# Patient Record
Sex: FEMALE | Race: WHITE | NOT HISPANIC OR LATINO | Employment: OTHER | URBAN - METROPOLITAN AREA
[De-identification: names, ages, dates, MRNs, and addresses within clinical notes are randomized per-mention and may not be internally consistent; named-entity substitution may affect disease eponyms.]

---

## 2018-03-05 ENCOUNTER — TRANSCRIBE ORDERS (OUTPATIENT)
Dept: ADMINISTRATIVE | Facility: HOSPITAL | Age: 73
End: 2018-03-05

## 2018-03-05 ENCOUNTER — TRANSCRIBE ORDERS (OUTPATIENT)
Dept: NEUROSURGERY | Facility: CLINIC | Age: 73
End: 2018-03-05

## 2018-03-05 DIAGNOSIS — Z01.818 PRE-PROCEDURAL EXAMINATION: Primary | ICD-10-CM

## 2018-03-05 DIAGNOSIS — D32.9 MENINGIOMA (HCC): Primary | ICD-10-CM

## 2018-04-10 ENCOUNTER — APPOINTMENT (OUTPATIENT)
Dept: LAB | Facility: CLINIC | Age: 73
End: 2018-04-10
Payer: COMMERCIAL

## 2018-04-10 DIAGNOSIS — Z01.818 PRE-PROCEDURAL EXAMINATION: ICD-10-CM

## 2018-04-10 LAB
BUN SERPL-MCNC: 10 MG/DL (ref 5–25)
CREAT SERPL-MCNC: 0.96 MG/DL (ref 0.6–1.3)
GFR SERPL CREATININE-BSD FRML MDRD: 60 ML/MIN/1.73SQ M

## 2018-04-10 PROCEDURE — 36415 COLL VENOUS BLD VENIPUNCTURE: CPT

## 2018-04-10 PROCEDURE — 82565 ASSAY OF CREATININE: CPT

## 2018-04-10 PROCEDURE — 84520 ASSAY OF UREA NITROGEN: CPT

## 2018-04-19 ENCOUNTER — HOSPITAL ENCOUNTER (OUTPATIENT)
Dept: MRI IMAGING | Facility: CLINIC | Age: 73
Discharge: HOME | End: 2018-04-19
Payer: COMMERCIAL

## 2018-04-19 DIAGNOSIS — D32.9 MENINGIOMA (HCC): ICD-10-CM

## 2018-04-19 PROCEDURE — A9585 GADOBUTROL INJECTION: HCPCS | Performed by: PHYSICIAN ASSISTANT

## 2018-04-19 PROCEDURE — 70553 MRI BRAIN STEM W/O & W/DYE: CPT

## 2018-04-19 RX ADMIN — GADOBUTROL 7 ML: 604.72 INJECTION INTRAVENOUS at 13:25

## 2018-04-24 ENCOUNTER — TELEPHONE (OUTPATIENT)
Dept: NEUROSURGERY | Facility: CLINIC | Age: 73
End: 2018-04-24

## 2018-04-24 DIAGNOSIS — H74.90 DISORDER OF MASTOID, UNSPECIFIED LATERALITY: Primary | ICD-10-CM

## 2018-04-24 NOTE — TELEPHONE ENCOUNTER
Patient had her follow up MRI Brain completed (4/19/18)  but does not have pending office visit  Please have office contact patient and arrange office visit (cheikh Bangura / Dr Giuseppe Yu) to review MRI Brain with patient  Also - please inform patient and PCP (Dr Merrill Guardian) that patient needs to follow up with PCP for evaluation / management of incidental finding reported on MRI Brain ("PARANASAL SINUSES:  Inferior mastoid opacification")  Referral to PCP placed in Epic      Thank you

## 2018-04-26 NOTE — TELEPHONE ENCOUNTER
Called listed number without response  Message left requesting a call back to schedule an appt  To review her recent MRI

## 2018-05-03 ENCOUNTER — TELEPHONE (OUTPATIENT)
Dept: NEUROSURGERY | Facility: CLINIC | Age: 73
End: 2018-05-03

## 2018-05-03 NOTE — TELEPHONE ENCOUNTER
Pt returned call to appt line which was tranferred to [facility] and now being addressed by her

## 2018-05-03 NOTE — TELEPHONE ENCOUNTER
Patient called stating that she is a previous patient of Dr Mariia Pepe and she recently had an MRI brain with and without contrast done on 4/19/18 for follow up of meningioma s/p resection  She reported that last time she was seen with Dr Mariia Pepe he stated that a skype conversation could be done for her next appointment since she lives 2 hours away  I told her I would review with Dr Mariia Pepe and give her a call back with an answer on what we should do

## 2018-06-07 ENCOUNTER — OFFICE VISIT (OUTPATIENT)
Dept: NEUROSURGERY | Facility: CLINIC | Age: 73
End: 2018-06-07

## 2018-06-07 DIAGNOSIS — Z48.811 ENCOUNTER FOR SURGICAL AFTERCARE FOLLOWING SURGERY OF NERVOUS SYSTEM: Primary | ICD-10-CM

## 2018-06-07 RX ORDER — AMOXICILLIN AND CLAVULANATE POTASSIUM 875; 125 MG/1; MG/1
TABLET, FILM COATED ORAL
COMMUNITY
Start: 2018-03-14

## 2018-06-07 RX ORDER — ASPIRIN 81 MG/1
1 TABLET ORAL DAILY
COMMUNITY

## 2018-06-07 RX ORDER — TAMOXIFEN CITRATE 10 MG/1
1 TABLET ORAL 2 TIMES DAILY
COMMUNITY
Start: 2018-05-16

## 2018-06-07 NOTE — LETTER
June 7, 2018     Faviola Amaya DO  [address]  [place] [address] [facility]  [address]    Patient: Tra Cobian   YOB: 1945   Date of Visit: 6/7/2018       Dear Dr Zion Bowie: Thank you for referring Cole Up to me for evaluation  Below are my notes for this consultation  If you have questions, please do not hesitate to call me  I look forward to following your patient along with you  Sincerely,        Shweta Fox MD        CC: Deedee Cordova MD  6/7/2018  2:12 PM  Sign at close encounter  Neurosurgery Office Note  Tra Cobian is a 68 y o  female    Type of Visit: Follow-up (Coference call)      CONCEPCION / Jazmine Moyer was seen today for follow-up  Diagnoses and all orders for this visit:    Encounter for surgical aftercare following surgery of nervous system  -     MRI brain with and without contrast; Future          DISCUSSION SUMMARY  This is a 78-year-old female who is status post resection of a large intraparenchymal and periventricular meningioma  This demonstrates complete resection of the mass  There is no sign of recurrence  The patient is nonfocal neurologically and she is asymptomatic  She is very happy with her result  Because the position and size of the original tumor and its proximity to the intraventricular system continued follow-up is indicated  Because of the appearance of this current MRI I think would be safe to wait 5 years before repeating a study  This was a telephone conversation with the patient  She could not be seen directly because of the significant distance and a hardship been traveling along distance  CHIEF COMPLAINT  Conference call for 2 year follow up meningioma with MRI brain s/p Image guided right transcortical approach for resection of large occipital parietal intraventricular mass 1/6/12  Diagnoses:   1  Meningioma (225 2) (D32 9)  2  Surgical aftercare, neoplasm (V58 42) (Z48 3)  3  Neoplasm, brain (239 6) (D49 6)    SX INFO  - 1/6/12 (DKO/HOD) Image guided right transcortical approach for resection of large occipital parietal intraventricular mass  HISTORY OF PRESENT ILLNESS  Patient denies headaches  She has no seizures  She has no nausea or vomiting  Her concentration is normal   She denies any difficulty with reading  She has no problems with her sleep-wake cycles  REVIEW OF SYSTEMS  Review of Systems   Constitutional: Negative  HENT: Negative  Eyes: Negative  Respiratory: Negative  Cardiovascular: Negative  Gastrointestinal: Negative  Endocrine: Negative  Genitourinary: Negative  Musculoskeletal: Negative  Skin: Negative  Allergic/Immunologic: Negative  Neurological: Negative  Hematological: Negative  Psychiatric/Behavioral: Negative  All other systems reviewed and are negative  The patient's ROS was reviewed by MD RAY reviewed the ROS    Active Ambulatory Problems     Diagnosis Date Noted    Encounter for surgical aftercare following surgery of nervous system 06/07/2018     Resolved Ambulatory Problems     Diagnosis Date Noted    No Resolved Ambulatory Problems     Past Medical History:   Diagnosis Date    [facility] [address] [facility]     [facility]oplasm[facility] brain [facility] [address] [facility]     Surgical aftercare, neoplasm        Past Surgical History:   Procedure Laterality Date    BRAIN TUMOR EXCISION      1/6/12 (DKO/HOD) Image guided right transcortical approach for resection of large occipital parietal intraventricular mass   BREAST LUMPECTOMY Right     Cancer cells       History   Smoking Status    Former Smoker    Quit date: 2008   Smokeless Tobacco    Former User       History   Alcohol Use    Yes     Comment: occasionally       History   Drug Use No       There were no vitals filed for this visit  THIS WAS A CONFERENCE CALL SO VITALS COULD BE TAKEN AT THE TIME   MARLENE    Current Outpatient Prescriptions:     amoxicillin-clavulanate (AUGMENTIN) 875-125 mg per tablet, As needed, Disp: , Rfl:     aspirin (ECOTRIN LOW STRENGTH) 81 mg EC tablet, Take 1 tablet by mouth daily, Disp: , Rfl:     tamoxifen (NOLVADEX) 10 mg tablet, Take 1 tablet by mouth 2 (two) times a day, Disp: , Rfl:     The following portions of the patient's history were reviewed by MD and updated by MA as appropriate: allergies, current medications, past family history, past medical history, past social history, past surgical history and problem list       Physical Exam  This was performed by questioning but not by direct examination as the patient was not physically present our office  Her strength is 5/5 bilaterally  There is no noticeable asymmetry in her face when viewed in a mere  She has no difficulty with her gait  She denies any balance difficulties    RESULTS/DATA  An MRI of the brain is compared with a study from 2011  This demonstrates a complete resection of the parietal mass  There is no sign of recurrence  The midline has returned to a normal position

## 2018-06-07 NOTE — PROGRESS NOTES
Neurosurgery Office Note  Ofelia Wilson is a 68 y o  female    Type of Visit: Follow-up (Coference call)      ASSESSMENT / Harmeetdeisi Lee was seen today for follow-up  Diagnoses and all orders for this visit:    Encounter for surgical aftercare following surgery of nervous system  -     MRI brain with and without contrast; Future          DISCUSSION SUMMARY  This is a 72-year-old female who is status post resection of a large intraparenchymal and periventricular meningioma  This demonstrates complete resection of the mass  There is no sign of recurrence  The patient is nonfocal neurologically and she is asymptomatic  She is very happy with her result  Because the position and size of the original tumor and its proximity to the intraventricular system continued follow-up is indicated  Because of the appearance of this current MRI I think would be safe to wait 5 years before repeating a study  This was a telephone conversation with the patient  She could not be seen directly because of the significant distance and a hardship been traveling along distance  CHIEF COMPLAINT  Conference call for 2 year follow up meningioma with MRI brain s/p Image guided right transcortical approach for resection of large occipital parietal intraventricular mass 1/6/12  Diagnoses:   1  Meningioma (225 2) (D32 9)  2  Surgical aftercare, neoplasm (V58 42) (Z48 3)  3  Neoplasm, brain (239 6) (D49 6)    SX INFO  - 1/6/12 (DKO/HOD) Image guided right transcortical approach for resection of large occipital parietal intraventricular mass  HISTORY OF PRESENT ILLNESS  Patient denies headaches  She has no seizures  She has no nausea or vomiting  Her concentration is normal   She denies any difficulty with reading  She has no problems with her sleep-wake cycles  REVIEW OF SYSTEMS  Review of Systems   Constitutional: Negative  HENT: Negative  Eyes: Negative  Respiratory: Negative      Cardiovascular: Negative  Gastrointestinal: Negative  Endocrine: Negative  Genitourinary: Negative  Musculoskeletal: Negative  Skin: Negative  Allergic/Immunologic: Negative  Neurological: Negative  Hematological: Negative  Psychiatric/Behavioral: Negative  All other systems reviewed and are negative  The patient's ROS was reviewed by MD   I reviewed the ROS    Active Ambulatory Problems     Diagnosis Date Noted    Encounter for surgical aftercare following surgery of nervous system 06/07/2018     Resolved Ambulatory Problems     Diagnosis Date Noted    No Resolved Ambulatory Problems     Past Medical History:   Diagnosis Date    [facility] [address] [facility]     [facility]oplasm[facility] brain [facility] [address] [facility]     Surgical aftercare, neoplasm        Past Surgical History:   Procedure Laterality Date    BRAIN TUMOR EXCISION      1/6/12 (DKO/HOD) Image guided right transcortical approach for resection of large occipital parietal intraventricular mass   BREAST LUMPECTOMY Right     Cancer cells       History   Smoking Status    Former Smoker    Quit date: 2008   Smokeless Tobacco    Former User       History   Alcohol Use    Yes     Comment: occasionally       History   Drug Use No       There were no vitals filed for this visit  THIS WAS A CONFERENCE CALL SO VITALS COULD BE TAKEN AT THE TIME   MARLENE    Current Outpatient Prescriptions:     amoxicillin-clavulanate (AUGMENTIN) 875-125 mg per tablet, As needed, Disp: , Rfl:     aspirin (ECOTRIN LOW STRENGTH) 81 mg EC tablet, Take 1 tablet by mouth daily, Disp: , Rfl:     tamoxifen (NOLVADEX) 10 mg tablet, Take 1 tablet by mouth 2 (two) times a day, Disp: , Rfl:     The following portions of the patient's history were reviewed by MD and updated by MA as appropriate: allergies, current medications, past family history, past medical history, past social history, past surgical history and problem list       Physical Exam  This was performed by questioning but not by direct examination as the patient was not physically present our office  Her strength is 5/5 bilaterally  There is no noticeable asymmetry in her face when viewed in a mere  She has no difficulty with her gait  She denies any balance difficulties    RESULTS/DATA  An MRI of the brain is compared with a study from 2011  This demonstrates a complete resection of the parietal mass  There is no sign of recurrence  The midline has returned to a normal position

## 2023-05-08 ENCOUNTER — TELEPHONE (OUTPATIENT)
Dept: NEUROSURGERY | Facility: CLINIC | Age: 78
End: 2023-05-08

## 2023-05-08 DIAGNOSIS — D32.9 MENINGIOMA (HCC): ICD-10-CM

## 2023-05-08 DIAGNOSIS — Z48.811 ENCOUNTER FOR SURGICAL AFTERCARE FOLLOWING SURGERY OF NERVOUS SYSTEM: Primary | ICD-10-CM

## 2023-05-08 DIAGNOSIS — Z01.818 PRE-PROCEDURAL EXAMINATION: ICD-10-CM

## 2023-05-08 NOTE — TELEPHONE ENCOUNTER
5/8/23 - PT'S DTR, RORY, CALLED TO MAKE APPT W/DKO - LAST OFFICE VISIT 6/7/18 - NO RECENT IMAGING    PT IS HAVING DIZZINESS & LIGHTHEADEDNESS    INFORMED HER AN  WILL BE IN CONTACT WITH HER

## 2023-05-09 NOTE — TELEPHONE ENCOUNTER
Received a call from patient's daughter Nataly Borrego looking to schedule an appt for patient  Returned call to Nataly Borrego and advised since she has not been seen since 2018 an intake will have to be done  Advised this RN will route to the      She was appreciative of the call back

## 2023-05-09 NOTE — TELEPHONE ENCOUNTER
5/9/23 PER REVIEW, PT LAST SEEN 6/7/23 BY DKO FOR S/P MENINGIOMA RESECTION AND RECOMMENDED TO F/U IN 5 YEARS  NO INTAKE NEEDED  SENT MSG TO NURSE TO ADD ORDER FOR BRAIN MRI FOR PT TO SCHEDULE  PER MA-MARLENE ONCE MRI BRAIN COMPLETED, HAVE DKO REVIEW FOR RETURN OFFICE VISIT (NEWPT)

## 2023-05-10 NOTE — TELEPHONE ENCOUNTER
5/10/23 CALLED PT DGTR RORY AND ADVISED MRI BRAIN W/WO ORDERED AND TO SCHEDULE FOR 5 YR F/U WITH DKO  PER DENZEL ONCE MRI COMPLETED, DKO WILL NEED TO REVIEW FOR APPT (NEWPT SINCE OVER 5 YEARS NO INTAKE NEEDED)  RORY WILL CALL BACK TO ADVISE DATE OF MRI FOR DKO REVIEW

## 2023-05-10 NOTE — TELEPHONE ENCOUNTER
Received a call from patient stating she is in need of bloodwork orders for her MRI and also wanted to update Alexandra Rossi MA on her MRI date  Returned call to patient and advised this RN will place the orders for her bloodwork and will update Alexandra Rossi on her MRI date of 6/2  She stated an understanding and was appreciative of the call back

## 2023-05-15 NOTE — TELEPHONE ENCOUNTER
I called patient at [phone]  and explained that since it is a 5 yr f/u it would be considered new pt  appt and also be scheduled with and AP on DKO day   She verbalized understanding and we scheduled for 6/6/2023 10:00 AM Palma Nayak PA-C  --- NP/5 yr f/u with MRI brain 6/2 (DKO day)

## 2023-05-17 ENCOUNTER — APPOINTMENT (OUTPATIENT)
Dept: LAB | Facility: CLINIC | Age: 78
End: 2023-05-17

## 2023-05-17 DIAGNOSIS — Z01.818 PRE-PROCEDURAL EXAMINATION: ICD-10-CM

## 2023-05-17 LAB
BUN SERPL-MCNC: 8 MG/DL (ref 5–25)
CREAT SERPL-MCNC: 0.93 MG/DL (ref 0.6–1.3)
GFR SERPL CREATININE-BSD FRML MDRD: 59 ML/MIN/1.73SQ M

## 2023-06-02 ENCOUNTER — HOSPITAL ENCOUNTER (OUTPATIENT)
Dept: MRI IMAGING | Facility: CLINIC | Age: 78
Discharge: HOME | End: 2023-06-02

## 2023-06-02 DIAGNOSIS — D32.9 MENINGIOMA (HCC): ICD-10-CM

## 2023-06-02 RX ADMIN — GADOBUTROL 7 ML: 604.72 INJECTION INTRAVENOUS at 14:05

## 2023-06-05 PROBLEM — G93.89 MASS OF BRAIN: Status: ACTIVE | Noted: 2023-06-05

## 2023-06-05 NOTE — ASSESSMENT & PLAN NOTE
Returns for 5 year follow up s/p right transcortical resection of occipitoparietal intraventricular mass 1/6/12 by Dr Tasneem Pereira  · Pathology: WHO grade 1 meningioma   · Doing well  Noting intermittent right headache, lightheadedness  · Exam: non-focal    Imaging:  · MRI brain w/wo 6/2/23: 1  Postoperative changes related to prior intraventricular meningioma resection unchanged right temporoparietal postoperative cavity with surrounding gliosis  2  Mild chronic microangiopathic ischemic changes    Plan:  · Reviewed imaging with patient and daughter  Stable postop MRI with no evidence of new or recurrent disease since surgery in 2012  · No obvious cause for her lightheadedness and HA on MRI  Would follow up with PCP and agree with Neurology referral   Recommend follow up as directed by the NCCN guidelines  If there is new growth, there are options for management such as closer interval follow up vs SRS  Given stability, option of 5 year follow up vs as needed  At this point, she would like to follow up in 5 years with MRI brain to see AP, but may change her mind  Advised this is fine  Call sooner with any questions or concerns

## 2023-06-06 ENCOUNTER — OFFICE VISIT (OUTPATIENT)
Dept: NEUROSURGERY | Facility: CLINIC | Age: 78
End: 2023-06-06
Payer: COMMERCIAL

## 2023-06-06 VITALS
HEIGHT: 66 IN | SYSTOLIC BLOOD PRESSURE: 136 MMHG | DIASTOLIC BLOOD PRESSURE: 75 MMHG | HEART RATE: 72 BPM | BODY MASS INDEX: 24.59 KG/M2 | RESPIRATION RATE: 16 BRPM | TEMPERATURE: 97.8 F | WEIGHT: 153 LBS

## 2023-06-06 DIAGNOSIS — G93.89 MASS OF BRAIN: Primary | ICD-10-CM

## 2023-06-06 PROCEDURE — 99204 OFFICE O/P NEW MOD 45 MIN: CPT | Performed by: PHYSICIAN ASSISTANT

## 2023-06-06 NOTE — PROGRESS NOTES
Neurosurgery Office Note  Sal Ghosh 69 y o  female MRN: [id number]      Assessment/Plan     Mass of brain  Returns for 5 year follow up s/p right transcortical resection of occipitoparietal intraventricular mass 1/6/12 by Dr Viola Carson  · Pathology: WHO grade 1 meningioma   · Doing well  Noting intermittent right headache, lightheadedness  · Exam: non-focal    Imaging:  · MRI brain w/wo 6/2/23: 1  Postoperative changes related to prior intraventricular meningioma resection unchanged right temporoparietal postoperative cavity with surrounding gliosis  2  Mild chronic microangiopathic ischemic changes    Plan:  · Reviewed imaging with patient and daughter  Stable postop MRI with no evidence of new or recurrent disease since surgery in 2012  · No obvious cause for her lightheadedness and HA on MRI  Would follow up with PCP and agree with Neurology referral   Recommend follow up as directed by the NCCN guidelines  If there is new growth, there are options for management such as closer interval follow up vs SRS  Given stability, option of 5 year follow up vs as needed  At this point, she would like to follow up in 5 years with MRI brain to see AP, but may change her mind  Advised this is fine  Call sooner with any questions or concerns  Diagnoses and all orders for this visit:    Mass of brain          I have spent a total time of 30 minutes on 06/06/23 in caring for this patient including Diagnostic results, Prognosis, Instructions for management, Patient and family education, Impressions, Counseling / Coordination of care, Documenting in the medical record, Reviewing / ordering tests, medicine, procedures   and Obtaining or reviewing history          CHIEF COMPLAINT    Chief Complaint   Patient presents with   • New Patient Visit     NP/5 yr f/u with MRI brain 6/2 (DKO day)       HISTORY    History of Present Illness     69y o  year old female     69year old female seen for a 5 year follow up status post right transcortical resection of a parieto-occipital intraventricular mass 1/6/2012 by Dr Deisi Green  Pathology was WHO grade 1 meningioma  She is doing well since last seen  Notes intermittent lightheadedness at times  She is also complaining of pain at her plate site, this was worse about a month or so ago  Now notes intermittent 2/10 pain that comes and goes, unsure of any triggers  Denies vision changes, seizures, difficulty speaking, unilateral numbness or weakness in extremities, difficulty with walking or balance, BBI  See Discussion    REVIEW OF SYSTEMS    Review of Systems   Constitutional: Negative  HENT: Negative  Eyes: Negative  Respiratory: Negative  Cardiovascular: Negative  Gastrointestinal: Negative  Endocrine: Negative  Genitourinary: Negative  Musculoskeletal: Negative  Skin: Negative  Allergic/Immunologic: Negative  Neurological: Positive for light-headedness (occasional), numbness (left hand depending on positioning) and headaches (mild)  Hematological: Negative  Psychiatric/Behavioral: Negative  All other systems reviewed and are negative  ROS obtained by MA  Reviewed  See HPI  Meds/Allergies     Current Outpatient Medications   Medication Sig Dispense Refill   • amoxicillin-clavulanate (AUGMENTIN) 875-125 mg per tablet As needed     • aspirin (ECOTRIN LOW STRENGTH) 81 mg EC tablet Take 1 tablet by mouth daily       No current facility-administered medications for this visit  Allergies   Allergen Reactions   • Sulfamethoxazole-Trimethoprim      Other reaction(s):  Other (Please comment)  Diarrhea, leg cramps       PAST HISTORY    Past Medical History:   Diagnosis Date   • Mass of brain 6/5/2023   • Meningioma [facility])    • Neoplasm, brain [facility])    • Surgical aftercare, neoplasm        Past Surgical History:   Procedure Laterality Date   • BRAIN TUMOR EXCISION      1/6/12 (DKO/HOD) Image guided right transcortical approach "for resection of large occipital parietal intraventricular mass  • BREAST LUMPECTOMY Right     Cancer cells       Social History     Tobacco Use   • Smoking status: Former     Types: Cigarettes     Quit date: 2008     Years since quitting: 15 4   • Smokeless tobacco: Former   Substance Use Topics   • Alcohol use: Yes     Comment: occasionally   • Drug use: No       Family History   Problem Relation Age of Onset   • Alzheimer's disease Mother    • Alzheimer's disease Father          Above history personally reviewed  EXAM    Vitals:Blood pressure 136/75, pulse 72, temperature 97 8 °F (36 6 °C), temperature source Temporal, resp  rate 16, height 5' 6\" (1 676 m), weight 69 4 kg (153 lb)  ,Body mass index is 24 69 kg/m²  Physical Exam  Vitals reviewed  Constitutional:       General: She is awake  Appearance: Normal appearance  HENT:      Head: Normocephalic and atraumatic  Eyes:      Extraocular Movements: EOM normal       Conjunctiva/sclera: Conjunctivae normal       Pupils: Pupils are equal, round, and reactive to light  Cardiovascular:      Rate and Rhythm: Normal rate  Pulmonary:      Effort: Pulmonary effort is normal    Skin:     General: Skin is warm and dry  Neurological:      Mental Status: She is alert and oriented to person, place, and time  Motor: Motor strength is normal      Coordination: Finger-Nose-Finger Test and Heel to Shin Test normal       Gait: Gait is intact  Deep Tendon Reflexes:      Reflex Scores:       Bicep reflexes are 2+ on the right side and 2+ on the left side  Brachioradialis reflexes are 2+ on the right side and 2+ on the left side  Patellar reflexes are 2+ on the right side and 2+ on the left side  Achilles reflexes are 2+ on the right side and 2+ on the left side    Psychiatric:         Attention and Perception: Attention and perception normal          Mood and Affect: Mood and affect normal          Speech: Speech normal          " Behavior: Behavior normal  Behavior is cooperative  Thought Content: Thought content normal          Cognition and Memory: Cognition and memory normal          Judgment: Judgment normal          Neurologic Exam     Mental Status   Oriented to person, place, and time  Follows 2 step commands  Attention: normal  Concentration: normal    Speech: speech is normal   Level of consciousness: alert  Knowledge: good  Able to perform simple calculations  Able to name object  Able to repeat  Normal comprehension  Cranial Nerves     CN III, IV, VI   Pupils are equal, round, and reactive to light  Extraocular motions are normal    Right pupil: Shape: regular  Reactivity: brisk  Consensual response: intact  Left pupil: Shape: regular  Reactivity: brisk  Consensual response: intact  CN III: no CN III palsy  CN VI: no CN VI palsy  Nystagmus: none   Ophthalmoparesis: none  Upgaze: normal  Downgaze: normal  Conjugate gaze: present    CN V   Facial sensation intact  CN VII   Facial expression full, symmetric  CN VIII   Hearing: intact    CN XI   Right trapezius strength: normal  Left trapezius strength: normal    CN XII   CN XII normal      Motor Exam   Muscle bulk: normal  Overall muscle tone: normal  Right arm pronator drift: absent  Left arm pronator drift: absent    Strength   Strength 5/5 throughout       Sensory Exam   Light touch normal      Gait, Coordination, and Reflexes     Gait  Gait: normal    Coordination   Finger to nose coordination: normal  Heel to shin coordination: normal    Tremor   Resting tremor: absent  Intention tremor: absent  Action tremor: absent    Reflexes   Right brachioradialis: 2+  Left brachioradialis: 2+  Right biceps: 2+  Left biceps: 2+  Right patellar: 2+  Left patellar: 2+  Right achilles: 2+  Left achilles: 2+  Right [place]: absent  Left [place]: absent  Right ankle clonus: absent  Left ankle clonus: absent           MEDICAL DECISION MAKING    Imaging Studies: MRI brain with and without contrast    Result Date: 6/5/2023  Narrative: MRI BRAIN WITH AND WITHOUT CONTRAST INDICATION: D32 9: Benign neoplasm of meninges, unspecified  COMPARISON: MRI brain 4/19/2018 and additional prior studies  TECHNIQUE: Multiplanar, multisequence imaging of the brain was performed before and after gadolinium administration  IV Contrast:  7 mL of Gadobutrol injection (SINGLE-DOSE) IMAGE QUALITY:   Diagnostic  FINDINGS: BRAIN PARENCHYMA: Stable postoperative changes related to prior intraventricular meningioma resection with unchanged right temporoparietal postoperative cavity communicating with the right posterior and temporal horns  There is stable surrounding gliosis with no pathologic intra or extra-axial enhancement  There is no mass effect or midline shift  There is no intracranial hemorrhage  Normal posterior fossa  Diffusion imaging is unremarkable  Small scattered hyperintensities on T2/FLAIR imaging are noted in the periventricular and subcortical white matter demonstrating an appearance that is statistically most likely to represent mild microangiopathic change  VENTRICLES: Ex vacuo dilatation of the right temporal horn  There is no evidence of ventriculomegaly  SELLA AND PITUITARY GLAND:  Normal  ORBITS:  Normal  PARANASAL SINUSES: Mild ethmoidal mucosal thickening  Trace fluid within the right maxillary sinus  Mild right mastoid fluid  The left mastoid air cells are clear  VASCULATURE:  Evaluation of the major intracranial vasculature demonstrates appropriate flow voids  CALVARIUM AND SKULL BASE:  Normal  EXTRACRANIAL SOFT TISSUES:  Normal      Impression: 1  Postoperative changes related to prior intraventricular meningioma resection unchanged right temporoparietal postoperative cavity with surrounding gliosis  2  Mild chronic microangiopathic ischemic changes  3 Workstation performed: [id number]       I have personally reviewed pertinent reports     and I have personally reviewed pertinent films in PACS